# Patient Record
(demographics unavailable — no encounter records)

---

## 2025-06-18 NOTE — HISTORY OF PRESENT ILLNESS
[de-identified] : cpe Talks to a therapist for anxiety every 1-2 weeks.  Ate a small amount of cereal this am. He runs fairly regularly. He did a marathon in March. He noticed blood in the urine when training hard. Shortly after that he had LLQ pain.  Resolved after a few days on its own. Denies constipation.  He does work on the computer.

## 2025-06-18 NOTE — HEALTH RISK ASSESSMENT
[Very Good] : ~his/her~  mood as very good [Yes] : Yes [2 - 4 times a month (2 pts)] : 2-4 times a month (2 points) [1 or 2 (0 pts)] : 1 or 2 (0 points) [Never (0 pts)] : Never (0 points) [No] : In the past 12 months have you used drugs other than those required for medical reasons? No [No falls in past year] : Patient reported no falls in the past year [Little interest or pleasure doing things] : 1) Little interest or pleasure doing things [0] : 1) Little interest or pleasure doing things: Not at all (0) [Feeling down, depressed, or hopeless] : 2) Feeling down, depressed, or hopeless [1] : 2) Feeling down, depressed, or hopeless for several days (1) [PHQ-2 Negative - No further assessment needed] : PHQ-2 Negative - No further assessment needed [Fully functional (bathing, dressing, toileting, transferring, walking, feeding)] : Fully functional (bathing, dressing, toileting, transferring, walking, feeding) [Fully functional (using the telephone, shopping, preparing meals, housekeeping, doing laundry, using] : Fully functional and needs no help or supervision to perform IADLs (using the telephone, shopping, preparing meals, housekeeping, doing laundry, using transportation, managing medications and managing finances) [Never] : Never [Audit-CScore] : 2 [IRR3Mokom] : 1 [Change in mental status noted] : No change in mental status noted [Language] : denies difficulty with language [Behavior] : denies difficulty with behavior [Learning/Retaining New Information] : denies difficulty learning/retaining new information [Handling Complex Tasks] : denies difficulty handling complex tasks [Reasoning] : denies difficulty with reasoning [Spatial Ability and Orientation] : denies difficulty with spatial ability and orientation

## 2025-06-18 NOTE — ASSESSMENT
[Vaccines Reviewed] : Immunizations reviewed today. Please see immunization details in the vaccine log within the immunization flowsheet.  [FreeTextEntry1] : hematuria had a few episodes recommend urology eval  seasonal allergies otc prn  bilat wrist and right shoulder pain ortho referral  derm eval

## 2025-06-20 NOTE — ASSESSMENT
[Asymptomatic person age 45 years and older] : Asymptomatic person age 45 years and older [N/A] : Pregnant or possibly pregnant: N/A [] : Are you taking insulin or weight-loss diabetic medication (GLP-1s)? No [Patient is not allergic to latex] : Patient is not allergic to latex [Yes] : Assessment: This patient is a good candidate for a direct referral for colonoscopy? Yes [FreeTextEntry1] : Dr. Edouard  [FreeTextEntry2] : 6/18/25 [de-identified] : FYI-Childhood asthma only No active s/s  no current issues  [de-identified] : none [de-identified] : none

## 2025-06-20 NOTE — OTHER
[Eastern] : Eastern [FreeTextEntry1] : 06/20/2025  Subject: Direct Access Colonoscopy Referral - *ELIGIBLE Candidate - Proceed with Scheduling*   Hello,  This is the Nurse Navigator for the Direct Access Colonoscopy Program. I have reviewed the referral for ARIANNE DSOUZA and completed the telephonic screening process. I have documented all screening questions and responses in the patient's medical record for your review.   Based on the screening results, the patient meets the criteria and is deemed: **ELIGIBLE** for Direct Access Colonoscopy. Kindly proceed with scheduling the patient for their colonoscopy.   Please contact me if you require additional information or clarification. Nino Whiteside RN Direct Access Colonoscopy Program  [FreeTextEntry2] : [place]

## 2025-06-24 NOTE — HISTORY OF PRESENT ILLNESS
[de-identified] : 47yM pw increasing R shoulder pain - around armpit, worse with overhead exercise and sports.  Sharp, painful after massage. Also bl wrist pain on ulnar side. Hx of R multilig knee with Dr. Phillip hss.

## 2025-06-24 NOTE — DISCUSSION/SUMMARY
[de-identified] : 47yM pw R ssc tendinitis, bl tfcc.  The patient was extensively counseled on treatment options including but not limited to observation, rest/activity modification, bracing, anti-inflammatory medications, physical therapy, injections, and surgery.  The natural history of the disease was thoroughly explained.  We discussed that the majority of the time, this condition can be initially treated conservatively. The patient will proceed with: -PT, OT -diclofenac rx provided - pt instructed to take with meals and not with other nsaid's including advil, aleve, and toradol.  The pt understands to stop taking the medication if any stomach sx develop or they develop any type of bleeding or bruising, at which point they will present to their PMD -pt was instructed on the importance of resting, icing and elevating to minimize swelling -RTC 6w - bl wrist xr   I have personally obtained the history, reviewed the ROS as noted, and performed the physical examination today.  The patient and I discussed the assessment and options and developed the plan.  All questions were answered and the patient stated their understanding of the treatment plan and appreciation of the visit.   My cumulative time spent on this patient's visit included: Preparation for the visit, review of the medical records, review of pertinent diagnostic studies, examination and counseling of the patient on the above diagnosis, treatment plan and prognosis, orders of diagnostic tests, medications and/or appropriate procedures and documentation in the medical records of today's visit.   Valentino Olivares MD

## 2025-06-24 NOTE — PHYSICAL EXAM
[de-identified] :  Gen: NAD Resp: Nonlabored respirations, no SOB   Shoulder: Skin intact Tender over posterolateral deltoid 170/170/60/lumbar AROM 5/5 ER IR 4p/5 Abduction (-) Jolly/Diaz (+) Belly press/bear hug (-) Speed/yergason 5/5 D B T EPL FDP IO SILT amur 2+ rad bcr   1+ ant/post instability (-) O'erasto's (-) load and release (-) apprehension (-) Taiwo Arriaga (-) sulcus sign (-) AC pain, cross body adduction  [de-identified] : The following radiographs were ordered and read by me during this patient's visit. I reviewed each radiograph in detail with the patient and discussed the findings as highlighted below.   3views of the R shoulder were obtained today that show no fracture, or dislocation. There are no degenerative changes seen. There is no malalignment. No obvious osseous abnormality. Otherwise unremarkable.